# Patient Record
Sex: FEMALE | Race: WHITE
[De-identification: names, ages, dates, MRNs, and addresses within clinical notes are randomized per-mention and may not be internally consistent; named-entity substitution may affect disease eponyms.]

---

## 2018-05-29 ENCOUNTER — HOSPITAL ENCOUNTER (EMERGENCY)
Dept: HOSPITAL 17 - NEPC | Age: 43
Discharge: HOME | End: 2018-05-29
Payer: MEDICAID

## 2018-05-29 VITALS
DIASTOLIC BLOOD PRESSURE: 65 MMHG | SYSTOLIC BLOOD PRESSURE: 111 MMHG | HEART RATE: 92 BPM | RESPIRATION RATE: 20 BRPM | OXYGEN SATURATION: 99 %

## 2018-05-29 VITALS
SYSTOLIC BLOOD PRESSURE: 104 MMHG | HEART RATE: 78 BPM | RESPIRATION RATE: 18 BRPM | DIASTOLIC BLOOD PRESSURE: 64 MMHG | OXYGEN SATURATION: 99 %

## 2018-05-29 VITALS — WEIGHT: 187.39 LBS | BODY MASS INDEX: 30.12 KG/M2 | HEIGHT: 66 IN

## 2018-05-29 VITALS
SYSTOLIC BLOOD PRESSURE: 137 MMHG | OXYGEN SATURATION: 99 % | TEMPERATURE: 98.7 F | HEART RATE: 93 BPM | DIASTOLIC BLOOD PRESSURE: 76 MMHG | RESPIRATION RATE: 18 BRPM

## 2018-05-29 VITALS — SYSTOLIC BLOOD PRESSURE: 122 MMHG | DIASTOLIC BLOOD PRESSURE: 75 MMHG

## 2018-05-29 DIAGNOSIS — R51: Primary | ICD-10-CM

## 2018-05-29 DIAGNOSIS — R07.89: ICD-10-CM

## 2018-05-29 LAB
ALBUMIN SERPL-MCNC: 3.9 GM/DL (ref 3.4–5)
ALP SERPL-CCNC: 96 U/L (ref 45–117)
ALT SERPL-CCNC: 23 U/L (ref 10–53)
AST SERPL-CCNC: 16 U/L (ref 15–37)
BASOPHILS # BLD AUTO: 0.1 TH/MM3 (ref 0–0.2)
BASOPHILS NFR BLD: 0.5 % (ref 0–2)
BILIRUB SERPL-MCNC: 0.4 MG/DL (ref 0.2–1)
BUN SERPL-MCNC: 10 MG/DL (ref 7–18)
CALCIUM SERPL-MCNC: 9 MG/DL (ref 8.5–10.1)
CHLORIDE SERPL-SCNC: 104 MEQ/L (ref 98–107)
CREAT SERPL-MCNC: 0.62 MG/DL (ref 0.5–1)
EOSINOPHIL # BLD: 0.1 TH/MM3 (ref 0–0.4)
EOSINOPHIL NFR BLD: 0.8 % (ref 0–4)
ERYTHROCYTE [DISTWIDTH] IN BLOOD BY AUTOMATED COUNT: 12.9 % (ref 11.6–17.2)
GFR SERPLBLD BASED ON 1.73 SQ M-ARVRAT: 106 ML/MIN (ref 89–?)
GLUCOSE SERPL-MCNC: 169 MG/DL (ref 74–106)
HCO3 BLD-SCNC: 22.3 MEQ/L (ref 21–32)
HCT VFR BLD CALC: 45.4 % (ref 35–46)
HGB BLD-MCNC: 15.3 GM/DL (ref 11.6–15.3)
LYMPHOCYTES # BLD AUTO: 2.2 TH/MM3 (ref 1–4.8)
LYMPHOCYTES NFR BLD AUTO: 14.9 % (ref 9–44)
MCH RBC QN AUTO: 28.8 PG (ref 27–34)
MCHC RBC AUTO-ENTMCNC: 33.8 % (ref 32–36)
MCV RBC AUTO: 85.2 FL (ref 80–100)
MONOCYTE #: 0.6 TH/MM3 (ref 0–0.9)
MONOCYTES NFR BLD: 4.1 % (ref 0–8)
NEUTROPHILS # BLD AUTO: 11.6 TH/MM3 (ref 1.8–7.7)
NEUTROPHILS NFR BLD AUTO: 79.7 % (ref 16–70)
PLATELET # BLD: 294 TH/MM3 (ref 150–450)
PMV BLD AUTO: 7.5 FL (ref 7–11)
PROT SERPL-MCNC: 8.4 GM/DL (ref 6.4–8.2)
RBC # BLD AUTO: 5.33 MIL/MM3 (ref 4–5.3)
SODIUM SERPL-SCNC: 137 MEQ/L (ref 136–145)
WBC # BLD AUTO: 14.5 TH/MM3 (ref 4–11)

## 2018-05-29 PROCEDURE — 93005 ELECTROCARDIOGRAM TRACING: CPT

## 2018-05-29 PROCEDURE — 85025 COMPLETE CBC W/AUTO DIFF WBC: CPT

## 2018-05-29 PROCEDURE — 70450 CT HEAD/BRAIN W/O DYE: CPT

## 2018-05-29 PROCEDURE — 96374 THER/PROPH/DIAG INJ IV PUSH: CPT

## 2018-05-29 PROCEDURE — 99285 EMERGENCY DEPT VISIT HI MDM: CPT

## 2018-05-29 PROCEDURE — 84484 ASSAY OF TROPONIN QUANT: CPT

## 2018-05-29 PROCEDURE — 96375 TX/PRO/DX INJ NEW DRUG ADDON: CPT

## 2018-05-29 PROCEDURE — 80053 COMPREHEN METABOLIC PANEL: CPT

## 2018-05-29 PROCEDURE — 96361 HYDRATE IV INFUSION ADD-ON: CPT

## 2018-05-29 PROCEDURE — 71046 X-RAY EXAM CHEST 2 VIEWS: CPT

## 2018-05-29 NOTE — PD
HPI


Chief Complaint:  Neuro Symptoms/ Deficits


Time Seen by Provider:  17:41


Travel History


International Travel<30 days:  No


Contact w/Intl Traveler<30days:  No


Traveled to known affect area:  No





History of Present Illness


HPI


Is a 43-year-old woman presents emerged from complaining of right-sided 

headache.  She reports that the headache started yesterday, fairly abruptly, 

while she was watching the kids and relaxing at home.  Is been fairly 

persistent since that time.  Been associated with some nausea and dizziness, as 

well as some severe states the chest pain shortness of breath ongoing for the 

past week or so, getting worse.  She is not prone to headaches all personnel 

has a doctor for headaches in the past.  This is on the setting of increased 

stress at home as her father's been sick with cancer.  Only medical history is 

diabetes.  She denies any change in her vision or other associated neural 

complaints.





History


Past Medical History


*** Narrative Medical


Diabetes


Tetanus Vaccination:  < 5 Years


Influenza Vaccination:  No


LMP:  april 2018





Social History


Alcohol Use:  No


Tobacco Use:  No





Allergies-Medications


(Allergen,Severity, Reaction):  


Coded Allergies:  


     No Known Allergies (Unverified  Adverse Reaction, Unknown, 5/29/18)


Reported Meds & Prescriptions





Reported Meds & Active Scripts


Active


Reported


Ibuprofen 400 Mg Tab 400 Mg PO Q6H PRN


Metformin (Metformin HCl) 500 Mg Tab 500 Mg PO BIDPC








Review of Systems


Except as stated in HPI:  all other systems reviewed are Neg





Physical Exam


Narrative


GENERAL: Well-appearing 43-year-old woman, no acute distress.


SKIN: Focused skin assessment warm/dry.


HEAD: Atraumatic. Normocephalic. 


EYES: Pupils equal and round. No scleral icterus. No injection or drainage. 


ENT: No nasal bleeding or discharge.  Mucous membranes pink and moist.


NECK: Trachea midline. No JVD. 


CARDIOVASCULAR: Regular rate and rhythm.  No murmur appreciated.


RESPIRATORY: No accessory muscle use. Clear to auscultation. Breath sounds 

equal bilaterally. 


GASTROINTESTINAL: Abdomen soft, non-tender, nondistended. Hepatic and splenic 

margins not palpable. 


MUSCULOSKELETAL: No obvious deformities. No clubbing.  No cyanosis.  No edema. 


NEUROLOGICAL: Awake and alert.  Cranial nerves II through XII are intact.  

Visual fields are full to confrontation.  No facial asymmetry.  Strength is 

full and equal in the upper and lower extremities.  Sensations intact to light 

touch and equal upper and lower extremities.  Normal finger to nose.  Normal 

heel to shin.  Normal mental status.  Normal speech.


PSYCHIATRIC: Appropriate mood and affect; insight and judgment normal.





Data


Data


Last Documented VS





Vital Signs








  Date Time  Temp Pulse Resp B/P (MAP) Pulse Ox O2 Delivery O2 Flow Rate FiO2


 


5/29/18 17:51  82 18  100 Room Air  


 


5/29/18 17:51    104/64 (77)    


 


5/29/18 17:30 98.7       








Orders





 Orders


Complete Blood Count With Diff (5/29/18 18:01)


Comprehensive Metabolic Panel (5/29/18 18:01)


Ct Brain W/O Iv Contrast(Rout) (5/29/18 18:01)


Ecg Monitoring (5/29/18 18:01)


Iv Access Insert/Monitor (5/29/18 18:01)


Oximetry (5/29/18 18:01)


Sodium Chloride 0.9% Flush (Ns Flush) (5/29/18 18:15)


Prochlorperazine Inj (Compazine Inj) (5/29/18 18:15)


Diphenhydramine Inj (Benadryl Inj) (5/29/18 18:15)


Sodium Chlor 0.9% 1000 Ml Inj (Ns 1000 M (5/29/18 18:01)


Electrocardiogram (5/29/18 18:01)


Chest, Pa & Lat (5/29/18 18:01)








MDM


Medical Decision Making


Medical Screen Exam Complete:  Yes


Emergency Medical Condition:  Yes


Interpretation(s)


My review of EKG: Normal sinus rhythm at a rate of 79 normal axis, normal 

intervals, no acute ischemia.


Differential Diagnosis


Headache, migraine, stress, SAH, chest pain, ACS, PE, dissection, other


Narrative Course


Medical decision making.  





This is a 43-year-old woman presents to the emergency department complaining of 

headache, chest pain, vomiting.  Headache was abrupt in onset but nothing else 

about it suspicious for SAH.  In the setting of this chest pain, shortness of 

breath, ending stressful social situation.  She looks well.  Is no meningismus 

on exam negative.  Will check CT head.  Recommend treatment for migraines.  I 

think this is likely benign headache.  With the associated chest pain will 

check EKG and chest x-ray.  Likely outpatient follow-up.











Pradip Acuna MD May 29, 2018 18:09

## 2018-05-29 NOTE — PD
Data


Data


Last Documented VS





Vital Signs








  Date Time  Temp Pulse Resp B/P (MAP) Pulse Ox O2 Delivery O2 Flow Rate FiO2


 


5/29/18 19:27  92 20 111/65 (80) 99 Room Air  


 


5/29/18 17:30 98.7       








Orders





 Orders


Complete Blood Count With Diff (5/29/18 18:01)


Comprehensive Metabolic Panel (5/29/18 18:01)


Ct Brain W/O Iv Contrast(Rout) (5/29/18 18:01)


Ecg Monitoring (5/29/18 18:01)


Iv Access Insert/Monitor (5/29/18 18:01)


Oximetry (5/29/18 18:01)


Sodium Chloride 0.9% Flush (Ns Flush) (5/29/18 18:15)


Prochlorperazine Inj (Compazine Inj) (5/29/18 18:15)


Diphenhydramine Inj (Benadryl Inj) (5/29/18 18:15)


Sodium Chlor 0.9% 1000 Ml Inj (Ns 1000 M (5/29/18 18:01)


Electrocardiogram (5/29/18 18:01)


Chest, Pa & Lat (5/29/18 18:01)


Troponin I (5/29/18 18:58)





Labs





Laboratory Tests








Test


  5/29/18


18:10


 


White Blood Count 14.5 TH/MM3 


 


Red Blood Count 5.33 MIL/MM3 


 


Hemoglobin 15.3 GM/DL 


 


Hematocrit 45.4 % 


 


Mean Corpuscular Volume 85.2 FL 


 


Mean Corpuscular Hemoglobin 28.8 PG 


 


Mean Corpuscular Hemoglobin


Concent 33.8 % 


 


 


Red Cell Distribution Width 12.9 % 


 


Platelet Count 294 TH/MM3 


 


Mean Platelet Volume 7.5 FL 


 


Neutrophils (%) (Auto) 79.7 % 


 


Lymphocytes (%) (Auto) 14.9 % 


 


Monocytes (%) (Auto) 4.1 % 


 


Eosinophils (%) (Auto) 0.8 % 


 


Basophils (%) (Auto) 0.5 % 


 


Neutrophils # (Auto) 11.6 TH/MM3 


 


Lymphocytes # (Auto) 2.2 TH/MM3 


 


Monocytes # (Auto) 0.6 TH/MM3 


 


Eosinophils # (Auto) 0.1 TH/MM3 


 


Basophils # (Auto) 0.1 TH/MM3 


 


CBC Comment DIFF FINAL 


 


Differential Comment  


 


Blood Urea Nitrogen 10 MG/DL 


 


Creatinine 0.62 MG/DL 


 


Random Glucose 169 MG/DL 


 


Total Protein 8.4 GM/DL 


 


Albumin 3.9 GM/DL 


 


Calcium Level 9.0 MG/DL 


 


Alkaline Phosphatase 96 U/L 


 


Aspartate Amino Transf


(AST/SGOT) 16 U/L 


 


 


Alanine Aminotransferase


(ALT/SGPT) 23 U/L 


 


 


Total Bilirubin 0.4 MG/DL 


 


Sodium Level 137 MEQ/L 


 


Potassium Level 3.4 MEQ/L 


 


Chloride Level 104 MEQ/L 


 


Carbon Dioxide Level 22.3 MEQ/L 


 


Anion Gap 11 MEQ/L 


 


Estimat Glomerular Filtration


Rate 106 ML/MIN 


 


 


Troponin I


  LESS THAN 0.02


NG/ML











MDM


Supervised Visit with SUSAN:  No


Narrative Course


The patient was initially evaluated by the previous provider and signed out to 

me at the beginning of 7:00 PM pending labs, CT head, and disposition.  See his 

note for further details.  





Briefly this is a 43-year-old female who complains of chest pain and headache.  

She has had chest pain for about a week.  She began having a right-sided 

headache yesterday.  EKG was performed and shows no signs of ischemia.  Vital 

signs are within normal limits.  Patient has been under a lot of stress as her 

dad has cancer.  Previous provider do not believe that there was a serious 

underlying etiology for the patient's headaches such as SAH/meningitis or 

encephalitis.  At the time of my assessment the patient had received Benadryl, 

Compazine, and a liter of normal saline, and she states that her headache has 

resolved.  She is overall very well-appearing.  CBC: WBC 14.5, hemoglobin 15.3, 

hematocrit 45.4, platelets 294, neutrophils 80%.  CMP is remarkable for random 

glucose 169, otherwise unremarkable.  CT head read as negative CT head.  Chest x

-ray read as negative for acute process.  Patient has history of diabetes.  

Cardiac enzymes are negative.  Patient was made aware of all findings.  Chest 

pain is atypical and neither I nor the previous provider believe is ACS.  Again 

the patient feels significantly improved after receiving medications in the 

emergency department with resolution of her headache.  There are no focal 

neurologic findings.  She is resting comfortably.  She is stable for discharge 

home outpatient follow-up with her primary care physician this week.  She was 

advised on when to return to the emergency department.  She verbalizes 

understanding and agreement with plan.


Diagnosis





 Primary Impression:  


 Headache


 Qualified Codes:  R51 - Headache


 Additional Impression:  


 Atypical chest pain


Referrals:  


Primary Care Physician


3 days





***Additional Instruction:  


Follow-up with a primary care physician this week.


Return to the emergency department for worsening symptoms or any other concerns.


Disposition:  01 DISCHARGE HOME


Condition:  Stable











Herminio Zepeda MD May 29, 2018 19:21

## 2018-05-29 NOTE — RADRPT
EXAM DATE:  5/29/2018 7:06 PM EDT

AGE/SEX:        43 years / Female



INDICATIONS:  Cephalgia, Right sided face and neck pain, weakness, blurred vision.



CLINICAL DATA:  This is the patient's initial encounter. Patient reports that signs and symptoms have
 been present for 3 days and indicates a pain score of 8/10. 

                                                                          

MEDICAL/SURGICAL HISTORY:   None. None.



RADIATION DOSE:  56.35 CTDI (mGy)







COMPARISON:      No prior [facility] exams available for comparison.





TECHNIQUE:  CT of the head without contrast.  Using automated exposure control and adjustment of the 
mA and/or kV according to patient size, radiation dose was kept as low as reasonably achievable to ob
tain optimal diagnostic quality images.



FINDINGS: 

Cerebrum:  The ventricles are normal for age.  No evidence of midline shift, mass lesion, hemorrhage 
or acute infarction.  No extraaxial fluid collections are seen.

Posterior Fossa:  The cerebellum and brainstem are intact.  The 4th ventricle is midline.  The cerebe
llopontine angle is unremarkable.

Extracranial:  The visualized portion of the orbits is intact.

Skull:  The calvaria is intact.  No evidence of skull fracture.



CONCLUSION:

1.  Negative CT Head non contrast.



Electronically signed by: David Love MD  5/29/2018 7:12 PM EDT

## 2018-05-29 NOTE — RADRPT
EXAM DATE:  5/29/2018 6:46 PM EDT

AGE/SEX:        43 years / Female



INDICATIONS:  Chest pain x 1 month.



CLINICAL DATA:  This is the patient's initial encounter. Patient reports that signs and symptoms have
 been present for 1 day and indicates a pain score of 5/10. 

                                                                          

MEDICAL/SURGICAL HISTORY:       None. None.



COMPARISON:      No prior [facility] exams available for comparison.



FINDINGS:  

PA and lateral views of the chest demonstrate the lungs to be symmetrically aerated without evidence 
of mass, infiltrate or effusion. The cardiomediastinal contours are unremarkable. Osseous structures 
are intact.



CONCLUSION: 

Negative for acute process.



Electronically signed by: Flavio Maki MD  5/29/2018 6:46 PM EDT

## 2018-05-30 NOTE — EKG
Date Performed: 05/29/2018       Time Performed: 18:14:30

 

PTAGE:      43 years

 

EKG:      Sinus rhythm 

 

 LOW QRS VOLTAGE IN PRECORDIAL LEADS BORDERLINE ECG 

 

NO PREVIOUS TRACING            

 

DOCTOR:   Pradip Velez  Interpretating Date/Time  05/30/2018 14:09:56